# Patient Record
Sex: FEMALE | Race: WHITE | NOT HISPANIC OR LATINO | URBAN - METROPOLITAN AREA
[De-identification: names, ages, dates, MRNs, and addresses within clinical notes are randomized per-mention and may not be internally consistent; named-entity substitution may affect disease eponyms.]

---

## 2017-04-18 ENCOUNTER — EMERGENCY (EMERGENCY)
Facility: HOSPITAL | Age: 47
LOS: 1 days | End: 2017-04-18
Attending: INTERNAL MEDICINE | Admitting: EMERGENCY MEDICINE
Payer: COMMERCIAL

## 2017-04-18 VITALS
TEMPERATURE: 99 F | DIASTOLIC BLOOD PRESSURE: 77 MMHG | RESPIRATION RATE: 16 BRPM | HEART RATE: 67 BPM | SYSTOLIC BLOOD PRESSURE: 112 MMHG | OXYGEN SATURATION: 100 %

## 2017-04-18 VITALS
WEIGHT: 145.06 LBS | SYSTOLIC BLOOD PRESSURE: 108 MMHG | HEART RATE: 68 BPM | DIASTOLIC BLOOD PRESSURE: 76 MMHG | OXYGEN SATURATION: 99 % | TEMPERATURE: 99 F | HEIGHT: 62 IN | RESPIRATION RATE: 18 BRPM

## 2017-04-18 DIAGNOSIS — Z98.890 OTHER SPECIFIED POSTPROCEDURAL STATES: Chronic | ICD-10-CM

## 2017-04-18 DIAGNOSIS — Z98.89 OTHER SPECIFIED POSTPROCEDURAL STATES: Chronic | ICD-10-CM

## 2017-04-18 PROCEDURE — 99283 EMERGENCY DEPT VISIT LOW MDM: CPT

## 2017-04-18 RX ORDER — SERTRALINE 25 MG/1
1 TABLET, FILM COATED ORAL
Qty: 0 | Refills: 0 | COMMUNITY

## 2017-04-18 RX ORDER — IBUPROFEN 200 MG
600 TABLET ORAL ONCE
Qty: 0 | Refills: 0 | Status: COMPLETED | OUTPATIENT
Start: 2017-04-18 | End: 2017-04-18

## 2017-04-18 RX ADMIN — Medication 600 MILLIGRAM(S): at 15:25

## 2017-04-18 NOTE — ED PROVIDER NOTE - PHYSICAL EXAMINATION
both knees with ecchymoses,mild swelling but from and knee joint stable..ambulates without difficulty.

## 2017-04-18 NOTE — ED PROVIDER NOTE - OBJECTIVE STATEMENT
slipped on tile at home last night and fell onto both knees bruising constantine. 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

## 2017-06-30 ENCOUNTER — EMERGENCY (EMERGENCY)
Facility: HOSPITAL | Age: 47
LOS: 1 days | Discharge: HOME | End: 2017-06-30
Attending: EMERGENCY MEDICINE
Payer: COMMERCIAL

## 2017-06-30 VITALS
WEIGHT: 139.99 LBS | OXYGEN SATURATION: 98 % | HEIGHT: 63 IN | TEMPERATURE: 98 F | SYSTOLIC BLOOD PRESSURE: 105 MMHG | HEART RATE: 80 BPM | RESPIRATION RATE: 16 BRPM | DIASTOLIC BLOOD PRESSURE: 68 MMHG

## 2017-06-30 DIAGNOSIS — Z98.89 OTHER SPECIFIED POSTPROCEDURAL STATES: Chronic | ICD-10-CM

## 2017-06-30 DIAGNOSIS — Z98.890 OTHER SPECIFIED POSTPROCEDURAL STATES: Chronic | ICD-10-CM

## 2017-06-30 PROCEDURE — 99284 EMERGENCY DEPT VISIT MOD MDM: CPT | Mod: 25

## 2017-06-30 PROCEDURE — 99284 EMERGENCY DEPT VISIT MOD MDM: CPT

## 2017-06-30 PROCEDURE — 73610 X-RAY EXAM OF ANKLE: CPT | Mod: 26,RT

## 2017-06-30 PROCEDURE — 96372 THER/PROPH/DIAG INJ SC/IM: CPT

## 2017-06-30 PROCEDURE — 73610 X-RAY EXAM OF ANKLE: CPT

## 2017-06-30 RX ORDER — MORPHINE SULFATE 50 MG/1
4 CAPSULE, EXTENDED RELEASE ORAL ONCE
Qty: 0 | Refills: 0 | Status: DISCONTINUED | OUTPATIENT
Start: 2017-06-30 | End: 2017-06-30

## 2017-06-30 RX ADMIN — MORPHINE SULFATE 4 MILLIGRAM(S): 50 CAPSULE, EXTENDED RELEASE ORAL at 21:51

## 2017-06-30 NOTE — ED PROVIDER NOTE - OBJECTIVE STATEMENT
46 y/o F with hx of anemia who has iron infusion presenting to the ED complaining of right ankle pain s/p tripping on an escalator today. Denies neck pain, toe pain, and LOC. Admits to EtOH use today. 46 y/o F with hx of anemia who has iron infusion presenting to the ED complaining of right ankle pain s/p tripping on an escalator today. Pt reports that [place] when she was on the escalator, tolling her ankle, and catching herself. Denies neck pain, toe pain, head trauma, and LOC. Admits to EtOH use today.

## 2017-06-30 NOTE — ED ADULT NURSE NOTE - OBJECTIVE STATEMENT
pt fell while out drinking with friends. pt right later ankle ecchymotic from fall. pt alert and oriented X 4. received pt in yellow gown belongings not within reach

## 2017-06-30 NOTE — ED PROVIDER NOTE - PROGRESS NOTE DETAILS
Pt slept in ED. X-rays neg.  Pt has a friend in ED to take her home.  Rx air cast, CATHY, Rx Anaprox DS with Ortho f/u as outpt

## 2017-06-30 NOTE — ED ADULT TRIAGE NOTE - CHIEF COMPLAINT QUOTE
As per EMS pt fell 3 steps down escalator, Pt states she was drinking. ALOOB. denies hiting head. pt C/o of right ankle pain, right ankle immobilized. md at bedside.

## 2017-07-01 PROBLEM — Z00.00 ENCOUNTER FOR PREVENTIVE HEALTH EXAMINATION: Noted: 2017-07-01

## 2017-07-01 NOTE — ED ADULT NURSE REASSESSMENT NOTE - NS ED NURSE REASSESS COMMENT FT1
pt sleeping and appears comfortable, resp even and unlabored no distress noted, will continue to monitor

## 2020-02-26 ENCOUNTER — APPOINTMENT (OUTPATIENT)
Dept: MAMMOGRAPHY | Facility: CLINIC | Age: 50
End: 2020-02-26
Payer: COMMERCIAL

## 2020-02-26 ENCOUNTER — APPOINTMENT (OUTPATIENT)
Dept: RADIOLOGY | Facility: CLINIC | Age: 50
End: 2020-02-26
Payer: COMMERCIAL

## 2020-02-26 ENCOUNTER — OUTPATIENT (OUTPATIENT)
Dept: OUTPATIENT SERVICES | Facility: HOSPITAL | Age: 50
LOS: 1 days | End: 2020-02-26
Payer: COMMERCIAL

## 2020-02-26 DIAGNOSIS — Z00.8 ENCOUNTER FOR OTHER GENERAL EXAMINATION: ICD-10-CM

## 2020-02-26 DIAGNOSIS — Z98.890 OTHER SPECIFIED POSTPROCEDURAL STATES: Chronic | ICD-10-CM

## 2020-02-26 DIAGNOSIS — Z98.89 OTHER SPECIFIED POSTPROCEDURAL STATES: Chronic | ICD-10-CM

## 2020-02-26 PROCEDURE — 77067 SCR MAMMO BI INCL CAD: CPT

## 2020-02-26 PROCEDURE — 77074 RADEX OSSEOUS SURVEY LMTD: CPT | Mod: 26

## 2020-02-26 PROCEDURE — 77063 BREAST TOMOSYNTHESIS BI: CPT

## 2020-02-26 PROCEDURE — 77074 RADEX OSSEOUS SURVEY LMTD: CPT

## 2020-02-26 PROCEDURE — 77067 SCR MAMMO BI INCL CAD: CPT | Mod: 26

## 2020-02-26 PROCEDURE — 77063 BREAST TOMOSYNTHESIS BI: CPT | Mod: 26

## 2020-04-25 ENCOUNTER — MESSAGE (OUTPATIENT)
Age: 50
End: 2020-04-25

## 2020-05-02 LAB
SARS-COV-2 IGG SERPL IA-ACNC: 0.2 INDEX
SARS-COV-2 IGG SERPL QL IA: NEGATIVE

## 2020-10-11 ENCOUNTER — OUTPATIENT (OUTPATIENT)
Dept: OUTPATIENT SERVICES | Facility: HOSPITAL | Age: 50
LOS: 1 days | End: 2020-10-11
Payer: COMMERCIAL

## 2020-10-11 DIAGNOSIS — Z11.59 ENCOUNTER FOR SCREENING FOR OTHER VIRAL DISEASES: ICD-10-CM

## 2020-10-11 DIAGNOSIS — Z98.890 OTHER SPECIFIED POSTPROCEDURAL STATES: Chronic | ICD-10-CM

## 2020-10-11 DIAGNOSIS — Z98.89 OTHER SPECIFIED POSTPROCEDURAL STATES: Chronic | ICD-10-CM

## 2020-10-11 LAB — SARS-COV-2 RNA SPEC QL NAA+PROBE: SIGNIFICANT CHANGE UP

## 2020-10-11 PROCEDURE — U0003: CPT

## 2020-11-11 ENCOUNTER — OUTPATIENT (OUTPATIENT)
Dept: OUTPATIENT SERVICES | Facility: HOSPITAL | Age: 50
LOS: 1 days | End: 2020-11-11
Payer: COMMERCIAL

## 2020-11-11 DIAGNOSIS — Z98.89 OTHER SPECIFIED POSTPROCEDURAL STATES: Chronic | ICD-10-CM

## 2020-11-11 DIAGNOSIS — Z98.890 OTHER SPECIFIED POSTPROCEDURAL STATES: Chronic | ICD-10-CM

## 2020-11-11 DIAGNOSIS — N95.1 MENOPAUSAL AND FEMALE CLIMACTERIC STATES: ICD-10-CM

## 2020-11-11 PROCEDURE — 82670 ASSAY OF TOTAL ESTRADIOL: CPT

## 2020-11-11 PROCEDURE — 83001 ASSAY OF GONADOTROPIN (FSH): CPT

## 2020-11-11 PROCEDURE — 84439 ASSAY OF FREE THYROXINE: CPT

## 2020-11-11 PROCEDURE — 36415 COLL VENOUS BLD VENIPUNCTURE: CPT

## 2020-11-11 PROCEDURE — 84443 ASSAY THYROID STIM HORMONE: CPT

## 2023-02-24 ENCOUNTER — NON-APPOINTMENT (OUTPATIENT)
Age: 53
End: 2023-02-24

## 2023-02-24 ENCOUNTER — APPOINTMENT (OUTPATIENT)
Dept: BARIATRICS | Facility: CLINIC | Age: 53
End: 2023-02-24
Payer: COMMERCIAL

## 2023-02-24 VITALS
OXYGEN SATURATION: 96 % | BODY MASS INDEX: 26.01 KG/M2 | HEART RATE: 89 BPM | WEIGHT: 141.31 LBS | HEIGHT: 62 IN | TEMPERATURE: 96.7 F | SYSTOLIC BLOOD PRESSURE: 80 MMHG | DIASTOLIC BLOOD PRESSURE: 60 MMHG

## 2023-02-24 DIAGNOSIS — Z87.19 PERSONAL HISTORY OF OTHER DISEASES OF THE DIGESTIVE SYSTEM: ICD-10-CM

## 2023-02-24 DIAGNOSIS — Z80.8 FAMILY HISTORY OF MALIGNANT NEOPLASM OF OTHER ORGANS OR SYSTEMS: ICD-10-CM

## 2023-02-24 DIAGNOSIS — Z80.9 FAMILY HISTORY OF MALIGNANT NEOPLASM, UNSPECIFIED: ICD-10-CM

## 2023-02-24 DIAGNOSIS — Z82.49 FAMILY HISTORY OF ISCHEMIC HEART DISEASE AND OTHER DISEASES OF THE CIRCULATORY SYSTEM: ICD-10-CM

## 2023-02-24 DIAGNOSIS — F32.A DEPRESSION, UNSPECIFIED: ICD-10-CM

## 2023-02-24 DIAGNOSIS — D64.9 ANEMIA, UNSPECIFIED: ICD-10-CM

## 2023-02-24 PROCEDURE — 99204 OFFICE O/P NEW MOD 45 MIN: CPT

## 2023-02-24 RX ORDER — BUPROPION HYDROCHLORIDE 150 MG/1
150 TABLET, FILM COATED ORAL DAILY
Refills: 0 | Status: ACTIVE | COMMUNITY

## 2023-02-24 RX ORDER — CYANOCOBALAMIN (VITAMIN B-12) 1000MCG/ML
VIAL (ML) INJECTION
Refills: 0 | Status: ACTIVE | COMMUNITY

## 2023-02-24 RX ORDER — MULTIVITAMIN
TABLET ORAL DAILY
Refills: 0 | Status: ACTIVE | COMMUNITY

## 2023-02-24 RX ORDER — VENLAFAXINE HYDROCHLORIDE 150 MG/1
150 CAPSULE, EXTENDED RELEASE ORAL DAILY
Refills: 0 | Status: ACTIVE | COMMUNITY

## 2023-02-24 NOTE — ASSESSMENT
[FreeTextEntry1] : 52 year old F with a long-standing h/o morbid obesity, who presents today for evaluation of hypoglycemia s/p Laparoscopic Gastric Bypass 2004.\par Hypoglycemia vs late dumping syndrome\par \par Reviewed nutrition guidelines\par Reviewed with pt to eat every 3 hours goal of at least 5 smaller meals throughout the day\par Protein focus diet with complex carbohydrates, avoid simple carbohydrates\par Maintain daily food and symptom log focusing on timing since last meal\par \par Return to office in 1week\par Labs ordered to be done before next visit \par Follow up with Nutritionist Paty Vázquez \par Follow up with Endocrinology - continue metformin for now\par \par ------------\par Health maintenance and behavioral/nutrition counseling for obesity: An additional 30 minutes of the visit was spent counseling the patient regarding need for aggressive weight loss and behavior modification including nutrition and proper eating habits, including which foods to eat and avoid. \par I had a lengthy discussion regarding the patient's current nutrition and eating habits as detailed above in the HPI. I emphasized the importance of making healthier food choices including fresh fruits and vegetables, lean meats and protein sources. I recommended front loading calories, incorporating whole grains, and eliminating fast foods. I also discussed the importance of avoiding fried/fatty foods and foods containing high sugar content including juices/shakes/sodas/desserts. \par I also encouraged beginning an exercise program and recommended cardiovascular exercises along with strength training to build lean muscle. I made suggestions on different types of exercises to try.

## 2023-02-24 NOTE — HISTORY OF PRESENT ILLNESS
[Procedure: ___] : Procedure performed: [unfilled]  [Date of Surgery: ___] : Date of Surgery:   [unfilled] [de-identified] : 52 year old F with a long-standing h/o morbid obesity, who presents today for evaluation of hypoglycemia s/p Laparoscopic Gastric Bypass 2004. Pt had negative ED workup 3x in the last 3 weeks. Symptoms of hypoglycemia would occur mostly around 8p. Saw Endocrinologist recently, had 2 hr glucose tolerance test and was started on metformin last week. Pt checks fingersticks before meals ~60-90 (53 was the lowest and symptomatic). Pt also reports hot flushes. BP noted to be low in ED visits and here today.\par History of anemia gets iron infusions and B12 injection.\par Takes a regular multivitamin (not bariatric vitamin)\par \par Current dietary lifestyle:\par Breakfast: egg with cheese and multigrain toast or oatmeal\par Lunch: salad with grilled chicken and regular dressing\par Dinner: protein and vegetables \par Snacks: protein bars, parmesan chips, nuts  \par Drinks: reg coffee with spelnda and half-n-half, 64oz/day water\par \par Works as OR surgical tech at [employer]

## 2023-02-24 NOTE — PHYSICAL EXAM
[Normal] : affect appropriate [de-identified] : Eyeglasses [de-identified] : Equal chest rise, non-labored respirations, no audible wheezing  [de-identified] : Regular rate, no audible carotid bruits or JVD appreciated  [de-identified] : soft, NT, ND, no diastasis or hernias appreciated, incisions well healed

## 2023-02-24 NOTE — REVIEW OF SYSTEMS
[Fatigue] : fatigue [Dizziness] : dizziness [Negative] : Allergic/Immunologic [de-identified] : feeling of fogginess

## 2023-03-01 ENCOUNTER — APPOINTMENT (OUTPATIENT)
Dept: BARIATRICS/WEIGHT MGMT | Facility: CLINIC | Age: 53
End: 2023-03-01
Payer: COMMERCIAL

## 2023-03-01 PROCEDURE — 97802 MEDICAL NUTRITION INDIV IN: CPT | Mod: 95

## 2023-03-03 ENCOUNTER — APPOINTMENT (OUTPATIENT)
Dept: BARIATRICS | Facility: CLINIC | Age: 53
End: 2023-03-03
Payer: COMMERCIAL

## 2023-03-03 VITALS
WEIGHT: 141.31 LBS | HEIGHT: 62 IN | SYSTOLIC BLOOD PRESSURE: 114 MMHG | DIASTOLIC BLOOD PRESSURE: 78 MMHG | BODY MASS INDEX: 26.01 KG/M2 | OXYGEN SATURATION: 99 % | HEART RATE: 84 BPM | TEMPERATURE: 97 F

## 2023-03-03 DIAGNOSIS — R53.83 OTHER FATIGUE: ICD-10-CM

## 2023-03-03 DIAGNOSIS — R73.03 PREDIABETES.: ICD-10-CM

## 2023-03-03 DIAGNOSIS — R79.9 ABNORMAL FINDING OF BLOOD CHEMISTRY, UNSPECIFIED: ICD-10-CM

## 2023-03-03 PROCEDURE — 99214 OFFICE O/P EST MOD 30 MIN: CPT

## 2023-03-03 NOTE — ASSESSMENT
[FreeTextEntry1] : 52-year-old female with a history of severe obesity status post laparoscopic Rona-en-Y gastric bypass in 2004 here for follow-up for episodic hypoglycemia versus late dumping syndrome\par \par Labs reviewed with patient\par \par -Continue recording fingersticks and make note of blood glucose levels when symptoms occur\par -Discussed continuing frequent small meals with complex carbohydrates\par -Follow-up with endocrinology\par -CT abdomen pelvis pancreatic protocol to evaluate both bypass anatomy as well as pancreas\par -Follow-up with nutritionist as scheduled\par -Follow-up in 1 to 2 months or sooner if symptoms become more frequent

## 2023-03-03 NOTE — PHYSICAL EXAM
[Normal] : affect appropriate [de-identified] : Sitting comfortably, no acute distress [de-identified] : Normocephalic, atraumatic. Anicteric. [de-identified] : Supple [de-identified] : Equal chest rise, nonlabored respirations. No audible wheezing. [de-identified] : Regular rate and rhythm. [de-identified] : Soft [de-identified] : Normal gait, no obvious deformity

## 2023-03-03 NOTE — HISTORY OF PRESENT ILLNESS
[de-identified] : 52 year old F with a long-standing h/o morbid obesity, who presents today for evaluation of hypoglycemia s/p Laparoscopic Gastric Bypass 2004.\par She has since met with our nutritionist. Has been making the changes recommended by myself and nutritionist. \par Eating small frequents meals with complex carbohydrates\tessa Has been recording FS - lowest in the 60s at which she did have some light headedness which resolved with PO. Has only occurred twice since making these changes.\tessa Continues on Metformin per her endocrinologist

## 2023-03-06 ENCOUNTER — RESULT REVIEW (OUTPATIENT)
Age: 53
End: 2023-03-06

## 2023-03-07 ENCOUNTER — NON-APPOINTMENT (OUTPATIENT)
Age: 53
End: 2023-03-07

## 2023-03-07 LAB
25(OH)D3 SERPL-MCNC: 29.1 NG/ML
ALBUMIN SERPL ELPH-MCNC: 4.5 G/DL
ALP BLD-CCNC: 49 U/L
ALT SERPL-CCNC: 16 U/L
ANION GAP SERPL CALC-SCNC: 12 MMOL/L
AST SERPL-CCNC: 14 U/L
BASOPHILS # BLD AUTO: 0.05 K/UL
BASOPHILS NFR BLD AUTO: 1.1 %
BILIRUB SERPL-MCNC: 0.4 MG/DL
BUN SERPL-MCNC: 12 MG/DL
CALCIUM SERPL-MCNC: 9.5 MG/DL
CHLORIDE SERPL-SCNC: 103 MMOL/L
CO2 SERPL-SCNC: 25 MMOL/L
CREAT SERPL-MCNC: 0.73 MG/DL
EGFR: 99 ML/MIN/1.73M2
EOSINOPHIL # BLD AUTO: 0.19 K/UL
EOSINOPHIL NFR BLD AUTO: 4.2 %
ESTIMATED AVERAGE GLUCOSE: 103 MG/DL
FOLATE SERPL-MCNC: 10.4 NG/ML
GLUCOSE SERPL-MCNC: 85 MG/DL
HBA1C MFR BLD HPLC: 5.2 %
HCT VFR BLD CALC: 39 %
HGB BLD-MCNC: 12.4 G/DL
IMM GRANULOCYTES NFR BLD AUTO: 0.4 %
IRON SERPL-MCNC: 94 UG/DL
LYMPHOCYTES # BLD AUTO: 1.86 K/UL
LYMPHOCYTES NFR BLD AUTO: 40.9 %
MAN DIFF?: NORMAL
MCHC RBC-ENTMCNC: 29.9 PG
MCHC RBC-ENTMCNC: 31.8 GM/DL
MCV RBC AUTO: 94 FL
MONOCYTES # BLD AUTO: 0.38 K/UL
MONOCYTES NFR BLD AUTO: 8.4 %
NEUTROPHILS # BLD AUTO: 2.05 K/UL
NEUTROPHILS NFR BLD AUTO: 45 %
PLATELET # BLD AUTO: 243 K/UL
POTASSIUM SERPL-SCNC: 4.6 MMOL/L
PROT SERPL-MCNC: 6.2 G/DL
RBC # BLD: 4.15 M/UL
RBC # FLD: 13 %
SELENIUM SERPL-MCNC: 140 UG/L
SODIUM SERPL-SCNC: 140 MMOL/L
TSH SERPL-ACNC: 1.33 UIU/ML
VIT B1 SERPL-MCNC: 99.3 NMOL/L
VIT B12 SERPL-MCNC: 556 PG/ML
VIT B6 SERPL-MCNC: 6.8 UG/L
VIT C SERPL-MCNC: 0.8 MG/DL
WBC # FLD AUTO: 4.55 K/UL
ZINC SERPL-MCNC: 79 UG/DL

## 2023-03-08 ENCOUNTER — APPOINTMENT (OUTPATIENT)
Dept: CT IMAGING | Facility: CLINIC | Age: 53
End: 2023-03-08
Payer: COMMERCIAL

## 2023-03-08 ENCOUNTER — OUTPATIENT (OUTPATIENT)
Dept: OUTPATIENT SERVICES | Facility: HOSPITAL | Age: 53
LOS: 1 days | End: 2023-03-08
Payer: COMMERCIAL

## 2023-03-08 DIAGNOSIS — Z98.890 OTHER SPECIFIED POSTPROCEDURAL STATES: Chronic | ICD-10-CM

## 2023-03-08 DIAGNOSIS — Z98.84 BARIATRIC SURGERY STATUS: ICD-10-CM

## 2023-03-08 DIAGNOSIS — Z98.89 OTHER SPECIFIED POSTPROCEDURAL STATES: Chronic | ICD-10-CM

## 2023-03-08 LAB — COPPER SERPL-MCNC: 118 UG/DL

## 2023-03-08 PROCEDURE — 74177 CT ABD & PELVIS W/CONTRAST: CPT | Mod: 26

## 2023-03-08 PROCEDURE — 74177 CT ABD & PELVIS W/CONTRAST: CPT

## 2023-03-08 NOTE — ED PROVIDER NOTE - RESPIRATORY NEGATIVE STATEMENT, MLM
<-- Click to add NO significant Past Surgical History
no chest pain, no cough, and no shortness of breath.

## 2023-03-09 LAB
A-TOCOPHEROL VIT E SERPL-MCNC: 9.2 MG/L
BETA+GAMMA TOCOPHEROL SERPL-MCNC: 1.1 MG/L
VIT A SERPL-MCNC: 46.4 UG/DL

## 2023-03-14 ENCOUNTER — NON-APPOINTMENT (OUTPATIENT)
Age: 53
End: 2023-03-14

## 2023-03-14 LAB — MENADIONE SERPL-MCNC: 0.12 NG/ML

## 2023-03-29 ENCOUNTER — APPOINTMENT (OUTPATIENT)
Dept: VASCULAR SURGERY | Facility: CLINIC | Age: 53
End: 2023-03-29
Payer: COMMERCIAL

## 2023-03-29 VITALS
HEIGHT: 62 IN | WEIGHT: 140 LBS | DIASTOLIC BLOOD PRESSURE: 72 MMHG | SYSTOLIC BLOOD PRESSURE: 105 MMHG | BODY MASS INDEX: 25.76 KG/M2 | OXYGEN SATURATION: 96 % | HEART RATE: 77 BPM

## 2023-03-29 DIAGNOSIS — I87.1 COMPRESSION OF VEIN: ICD-10-CM

## 2023-03-29 PROCEDURE — 99203 OFFICE O/P NEW LOW 30 MIN: CPT

## 2023-03-29 NOTE — ASSESSMENT
[FreeTextEntry1] : Asymptomatic Nutcrakers on CT. No evidence of clinical significance. May be related to weght loss over time.

## 2023-03-29 NOTE — HISTORY OF PRESENT ILLNESS
[FreeTextEntry1] : 53 yo F with hx of Rona en Y gastric bypass in 2004. 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 Denies flank pain, hematuria or pelvic pain. She lost about 100 lbs 20 years ago.

## 2023-04-14 NOTE — ED PROVIDER NOTE - CHPI ED SYMPTOMS NEG
no weakness/no abrasion/no deformity/no numbness/no difficulty bearing weight/no tingling Render Risk Assessment In Note?: no Detail Level: Simple Patient Management Risk Assessment: Moderate

## 2023-04-26 ENCOUNTER — APPOINTMENT (OUTPATIENT)
Dept: BARIATRICS | Facility: CLINIC | Age: 53
End: 2023-04-26
Payer: COMMERCIAL

## 2023-04-26 VITALS
HEART RATE: 74 BPM | DIASTOLIC BLOOD PRESSURE: 70 MMHG | WEIGHT: 133.82 LBS | SYSTOLIC BLOOD PRESSURE: 100 MMHG | OXYGEN SATURATION: 99 % | HEIGHT: 62 IN | BODY MASS INDEX: 24.63 KG/M2 | TEMPERATURE: 96.7 F

## 2023-04-26 DIAGNOSIS — K91.2 POSTSURGICAL MALABSORPTION, NOT ELSEWHERE CLASSIFIED: ICD-10-CM

## 2023-04-26 PROCEDURE — 99214 OFFICE O/P EST MOD 30 MIN: CPT

## 2023-04-26 NOTE — ASSESSMENT
[FreeTextEntry1] : 52-year-old female with a history of severe obesity status post laparoscopic Rona-en-Y gastric bypass in 2004 here for follow-up for episodic hypoglycemia versus late dumping syndrome\par CT reviewed with no evidence of pancreatic mass\par  \par -continue diet and f/u with nutritionist as schedule\par -Continue recording fingersticks and make note of blood glucose levels when symptoms occur\par -Discussed continuing frequent small meals with complex carbohydrates\par -Follow-up with endocrinology\par -Follow-up in 3 to 4 months or sooner if symptoms become more frequent\par -all questions answered \par  DISPLAY PLAN FREE TEXT

## 2023-04-26 NOTE — HISTORY OF PRESENT ILLNESS
[de-identified] : 53 year old F s/p Laparoscopic Gastric Bypass 2004 who presents today for follow up of hypoglycemia \par She notes that hypoglycemia events are less frequent, FS 80-110s on average. Does have episodes in 60-65 where she is symptomatic.\par She notes she has been controlling her diet strictly based on the changes recommended by myself and our nutritionist.\par Eating small frequents meals with complex carbohydrates. Notes that she has hypoglycemic episodes if she varies from her strict diet (ie eats out)\par Continues on Metformin  \par Pt also found to have nutcracker syndrome on CT done to r/o pancreatic lesion as cause of hypoglycemia - and f/u with vascular (Dr. Magallanes)

## 2023-04-26 NOTE — PHYSICAL EXAM
[Normal] : affect appropriate [de-identified] : Sitting comfortably, no acute distress [de-identified] : Normocephalic, atraumatic. Anicteric. [de-identified] : Supple [de-identified] : Equal chest rise, nonlabored respirations. No audible wheezing. [de-identified] : Regular rate and rhythm. [de-identified] : Soft [de-identified] : Normal gait, no obvious deformity

## 2023-04-27 ENCOUNTER — APPOINTMENT (OUTPATIENT)
Dept: BARIATRICS/WEIGHT MGMT | Facility: CLINIC | Age: 53
End: 2023-04-27
Payer: COMMERCIAL

## 2023-04-27 PROCEDURE — 97803 MED NUTRITION INDIV SUBSEQ: CPT | Mod: 95

## 2023-05-01 VITALS — BODY MASS INDEX: 23.74 KG/M2 | HEIGHT: 62 IN | WEIGHT: 129 LBS

## 2023-07-26 ENCOUNTER — APPOINTMENT (OUTPATIENT)
Dept: BARIATRICS | Facility: CLINIC | Age: 53
End: 2023-07-26
Payer: COMMERCIAL

## 2023-07-26 VITALS
HEART RATE: 74 BPM | OXYGEN SATURATION: 99 % | SYSTOLIC BLOOD PRESSURE: 100 MMHG | TEMPERATURE: 96.9 F | DIASTOLIC BLOOD PRESSURE: 70 MMHG | WEIGHT: 125.22 LBS | BODY MASS INDEX: 23.04 KG/M2 | HEIGHT: 62 IN

## 2023-07-26 DIAGNOSIS — E46 UNSPECIFIED PROTEIN-CALORIE MALNUTRITION: ICD-10-CM

## 2023-07-26 PROCEDURE — 99214 OFFICE O/P EST MOD 30 MIN: CPT

## 2023-07-26 RX ORDER — METFORMIN HYDROCHLORIDE 500 MG/1
500 TABLET, COATED ORAL DAILY
Refills: 0 | Status: ACTIVE | COMMUNITY

## 2023-07-26 RX ORDER — METFORMIN HYDROCHLORIDE 1000 MG/1
1000 TABLET, COATED ORAL DAILY
Refills: 0 | Status: DISCONTINUED | COMMUNITY
End: 2023-07-26

## 2023-07-26 NOTE — HISTORY OF PRESENT ILLNESS
[de-identified] : 53 year old F s/p Laparoscopic Gastric Bypass 2004 who presents today for follow up of hypoglycemia \par On glucose monitor,  3-4 times a week BS goes below 60\par She notes that hypoglycemia events are less frequent, FS 90-100s on average. Does have episodes in 60-65 where she is symptomatic.\par She notes she has been controlling her diet strictly based on the changes recommended by myself and our nutritionist.\par Eating small frequent meals with complex carbohydrates. Notes that she has hypoglycemic episodes if she varies from her strict diet (ie eats out)\par Continues on Metformin, now on increased dose but continues to have hypoglycemic episodes. Continues to follow with her endocrinologist\par

## 2023-07-26 NOTE — ASSESSMENT
[FreeTextEntry1] : 53-year-old female with a history of severe obesity status post laparoscopic Rona-en-Y gastric bypass in 2004 here for follow-up for episodic hypoglycemia versus late dumping syndrome\par  \par -continue strict diet and f/u with nutritionist as scheduled\par -Continue recording fingersticks and make note of blood glucose levels when symptoms occur\par -Discussed continuing frequent small meals with complex carbohydrates\par -Follow-up with endocrinology\par -Follow-up in 3 to 4 months or sooner if symptoms become more frequent\par -all questions answered \par \par Additional time spent before and after encounter reviewing chart\par

## 2023-07-26 NOTE — PHYSICAL EXAM
[Normal] : affect appropriate [de-identified] : Sitting comfortably, no acute distress [de-identified] : Normocephalic, atraumatic. Anicteric. [de-identified] : Supple [de-identified] : Equal chest rise, nonlabored respirations. No audible wheezing. [de-identified] : Regular rate and rhythm. [de-identified] : Soft [de-identified] : Normal gait, no obvious deformity

## 2023-08-16 ENCOUNTER — TRANSCRIPTION ENCOUNTER (OUTPATIENT)
Age: 53
End: 2023-08-16

## 2023-10-17 ENCOUNTER — APPOINTMENT (OUTPATIENT)
Dept: BARIATRICS | Facility: CLINIC | Age: 53
End: 2023-10-17
Payer: COMMERCIAL

## 2023-10-17 VITALS
WEIGHT: 116 LBS | SYSTOLIC BLOOD PRESSURE: 100 MMHG | TEMPERATURE: 97 F | HEIGHT: 62 IN | DIASTOLIC BLOOD PRESSURE: 80 MMHG | HEART RATE: 82 BPM | BODY MASS INDEX: 21.35 KG/M2 | OXYGEN SATURATION: 98 %

## 2023-10-17 DIAGNOSIS — R63.4 ABNORMAL WEIGHT LOSS: ICD-10-CM

## 2023-10-17 DIAGNOSIS — E16.2 HYPOGLYCEMIA, UNSPECIFIED: ICD-10-CM

## 2023-10-17 DIAGNOSIS — Z98.84 BARIATRIC SURGERY STATUS: ICD-10-CM

## 2023-10-17 PROCEDURE — 99215 OFFICE O/P EST HI 40 MIN: CPT

## 2023-11-17 LAB
CARNITINE FREE SERPL-SCNC: 21 UMOL/L
CARNITINE SERPL-SCNC: 32 UMOL/L
ESTERIFIED/FREE: 0.5 RATIO

## 2024-01-23 ENCOUNTER — NON-APPOINTMENT (OUTPATIENT)
Age: 54
End: 2024-01-23